# Patient Record
Sex: FEMALE | Race: AMERICAN INDIAN OR ALASKA NATIVE
[De-identification: names, ages, dates, MRNs, and addresses within clinical notes are randomized per-mention and may not be internally consistent; named-entity substitution may affect disease eponyms.]

---

## 2021-08-23 ENCOUNTER — HOSPITAL ENCOUNTER (EMERGENCY)
Dept: HOSPITAL 5 - ED | Age: 26
Discharge: HOME | End: 2021-08-23
Payer: SELF-PAY

## 2021-08-23 VITALS — DIASTOLIC BLOOD PRESSURE: 89 MMHG | SYSTOLIC BLOOD PRESSURE: 121 MMHG

## 2021-08-23 DIAGNOSIS — M79.605: Primary | ICD-10-CM

## 2021-08-23 PROCEDURE — 99281 EMR DPT VST MAYX REQ PHY/QHP: CPT

## 2021-08-23 NOTE — EMERGENCY DEPARTMENT REPORT
Chief Complaint: Animal Bite


Stated Complaint: BIT LEG SWOLLEN


Time Seen by Provider: 08/23/21 20:23





- HPI


History of Present Illness: 





26-year-old -American female presents to the emergency room for a bug 

bite to her left posterior thigh.  Patient states that she noticed it today.  

Patient denies any pain but states that it itches.  She does notice that it is a

little swollen.  No fever no chills no trauma no chest pain or shortness of 

breath.





- Exam


Physical Exam: 





Patient is alert and oriented x3 no acute distress nontoxic in appearance


Respiratory no acute distress nonlabored breathing


Cardiac regular rate


Left lower extremity posterior thigh mild erythematous mild edema to his in the 

form of a localized reaction to a bug bite.  Ambulatory without difficulty


Patient is alert and oriented normal behavior mood and judgment


MSE screening note: 


Focused history and physical exam performed.


Due to findings the following was ordered:





26-year-old -American female presents to the emergency room for a bug 

bite to her left posterior thigh.  Patient states that she noticed it today.  

Patient denies any pain but states that it itches.  She does notice that it is a

little swollen.  No fever no chills no trauma no chest pain or shortness of 

breath.








Patient appears to have a local reaction to a bug bite.  No tenderness 

erythematous mild local reaction.  Patient can place over-the-counter Benadryl 

topical or hydrocortisone.  Encourage patient not to scratch as she can get a 

secondary bacterial infection.  Patient verbalized understanding





ED Disposition for MSE


Disposition: 01 HOME / SELF CARE / HOMELESS


Is pt being admited?: No


Does the pt Need Aspirin: No


Condition: Stable


Instructions:  Insect Bite, Adult, Easy-to-Read


Additional Instructions: 


Recommend over-the-counter Benadryl cream or hydrocortisone.  Do not scratch 

that she do not want to get a secondary bacterial infection.  Follow-up with her

primary care provider if they have any further concerns


Referrals: 


[facility] [Provider Group] - 3-5 Days


Forms:  Work/School Release Form(ED)

## 2021-11-30 ENCOUNTER — HOSPITAL ENCOUNTER (EMERGENCY)
Dept: HOSPITAL 5 - ED | Age: 26
Discharge: HOME | End: 2021-11-30
Payer: SELF-PAY

## 2021-11-30 VITALS — DIASTOLIC BLOOD PRESSURE: 88 MMHG | SYSTOLIC BLOOD PRESSURE: 132 MMHG

## 2021-11-30 DIAGNOSIS — F17.200: ICD-10-CM

## 2021-11-30 DIAGNOSIS — N94.6: ICD-10-CM

## 2021-11-30 DIAGNOSIS — A59.01: Primary | ICD-10-CM

## 2021-11-30 LAB
ALBUMIN SERPL-MCNC: 4.3 G/DL (ref 3.9–5)
ALT SERPL-CCNC: 14 UNITS/L (ref 7–56)
BASOPHILS # (AUTO): 0 K/MM3 (ref 0–0.1)
BASOPHILS NFR BLD AUTO: 0.3 % (ref 0–1.8)
BILIRUB DIRECT SERPL-MCNC: < 0.2 MG/DL (ref 0–0.2)
BILIRUB UR QL STRIP: (no result)
BLOOD UR QL VISUAL: (no result)
BUN SERPL-MCNC: 8 MG/DL (ref 7–17)
BUN/CREAT SERPL: 16 %
CALCIUM SERPL-MCNC: 8.8 MG/DL (ref 8.4–10.2)
EOSINOPHIL # BLD AUTO: 0 K/MM3 (ref 0–0.4)
EOSINOPHIL NFR BLD AUTO: 0.8 % (ref 0–4.3)
HCT VFR BLD CALC: 43.2 % (ref 30.3–42.9)
HEMOLYSIS INDEX: 6
HGB BLD-MCNC: 13.8 GM/DL (ref 10.1–14.3)
LYMPHOCYTES # BLD AUTO: 0.8 K/MM3 (ref 1.2–5.4)
LYMPHOCYTES NFR BLD AUTO: 13.7 % (ref 13.4–35)
MCHC RBC AUTO-ENTMCNC: 32 % (ref 30–34)
MCV RBC AUTO: 91 FL (ref 79–97)
MONOCYTES # (AUTO): 0.5 K/MM3 (ref 0–0.8)
MONOCYTES % (AUTO): 8.9 % (ref 0–7.3)
MUCOUS THREADS #/AREA URNS HPF: (no result) /HPF
PH UR STRIP: 5 [PH] (ref 5–7)
PLATELET # BLD: 222 K/MM3 (ref 140–440)
RBC # BLD AUTO: 4.75 M/MM3 (ref 3.65–5.03)
RBC #/AREA URNS HPF: > 182 /HPF (ref 0–6)
UROBILINOGEN UR-MCNC: < 2 MG/DL (ref ?–2)
WBC #/AREA URNS HPF: < 1 /HPF (ref 0–6)

## 2021-11-30 PROCEDURE — 96372 THER/PROPH/DIAG INJ SC/IM: CPT

## 2021-11-30 PROCEDURE — 85025 COMPLETE CBC W/AUTO DIFF WBC: CPT

## 2021-11-30 PROCEDURE — 81001 URINALYSIS AUTO W/SCOPE: CPT

## 2021-11-30 PROCEDURE — 83690 ASSAY OF LIPASE: CPT

## 2021-11-30 PROCEDURE — 87210 SMEAR WET MOUNT SALINE/INK: CPT

## 2021-11-30 PROCEDURE — 99284 EMERGENCY DEPT VISIT MOD MDM: CPT

## 2021-11-30 PROCEDURE — 84703 CHORIONIC GONADOTROPIN ASSAY: CPT

## 2021-11-30 PROCEDURE — 80048 BASIC METABOLIC PNL TOTAL CA: CPT

## 2021-11-30 PROCEDURE — 80076 HEPATIC FUNCTION PANEL: CPT

## 2021-11-30 PROCEDURE — 87591 N.GONORRHOEAE DNA AMP PROB: CPT

## 2021-11-30 PROCEDURE — 36415 COLL VENOUS BLD VENIPUNCTURE: CPT

## 2021-11-30 NOTE — EMERGENCY DEPARTMENT REPORT
ED Abdominal Pain HPI





- General


Chief Complaint: Abdominal Pain


Stated Complaint: I THINK ECTOPIC


Time Seen by Provider: 11/30/21 09:46


Source: patient


Mode of arrival: Ambulatory


Limitations: No Limitations





- History of Present Illness


Initial Comments: 


26-year-old female presents to the ER today with complaints of low 

abdominal/pelvic pain. Patient states that the pain started around 6 AM this 

morning and she feels like is getting worse. She states it has been a sharp 

constant pain since it started. She states that she has vomited 4 times. She did

have a bowel movement this morning and it was normal. She states that she did 

start with her vaginal bleeding this morning. She assumed it was related to her 

menstrual cycle, but she states that she became concerned when she started 

having worsening pain. She states that the pain feels similar to when she had an

ectopic at age 21. She states her last menstrual cycle was October 30. She has 

not taken a home pregnancy test. She denies any UTI symptoms. She denies any 

abnormal vaginal discharge. She denies any new sexual partners.





MD Complaint: abdominal pain, other (pelvic pain)


-: Sudden, This morning


Severity scale (0 -10): 8





- Related Data


                                  Previous Rx's











 Medication  Instructions  Recorded  Last Taken  Type


 


Doxycycline Hyclate [Doxycycline 100 mg PO Q12HR #14 tab 11/30/21 Unknown Rx





Hyclate TAB]    


 


Ibuprofen [Motrin] 600 mg PO Q8H PRN #30 tablet 11/30/21 Unknown Rx


 


Ondansetron [Zofran Odt] 4 mg PO Q8HR #15 tab.rapdis 11/30/21 Unknown Rx











                                    Allergies











Allergy/AdvReac Type Severity Reaction Status Date / Time


 


No Known Allergies Allergy   Verified 11/30/21 10:27














ED Review of Systems


ROS: 


Stated complaint: I THINK ECTOPIC


Other details as noted in HPI





Comment: All other systems reviewed and negative


Constitutional: denies: chills, diaphoresis, fever, malaise, weakness


Respiratory: denies: cough, shortness of breath, wheezing


Cardiovascular: denies: chest pain, palpitations


Gastrointestinal: abdominal pain, nausea, vomiting, other (pelvic ).  denies: 

diarrhea, constipation, hematemesis, melena, hematochezia


Genitourinary: denies: urgency, dysuria, frequency, hematuria, discharge, 

abnormal menses, dyspareunia


Musculoskeletal: denies: back pain, joint swelling, arthralgia, myalgia


Skin: denies: rash, lesions, change in color, change in hair/nails, pruritus





ED Past Medical Hx





- Past Medical History


Additional medical history: endometriosis





- Surgical History


Additional Surgical History: ectopic preg 2016





- Social History


Smoking Status: Current Every Day Smoker


Substance Use Type: None





- Medications


Home Medications: 


                                Home Medications











 Medication  Instructions  Recorded  Confirmed  Last Taken  Type


 


Doxycycline Hyclate [Doxycycline 100 mg PO Q12HR #14 tab 11/30/21  Unknown Rx





Hyclate TAB]     


 


Ibuprofen [Motrin] 600 mg PO Q8H PRN #30 tablet 11/30/21  Unknown Rx


 


Ondansetron [Zofran Odt] 4 mg PO Q8HR #15 tab.rapdis 11/30/21  Unknown Rx














ED Physical Exam





- General


Limitations: No Limitations


General appearance: alert, in no apparent distress, anxious





- Head


Head exam: Present: atraumatic, normocephalic, normal inspection





- Eye


Eye exam: Present: normal appearance, PERRL, EOMI


Pupils: Present: normal accommodation





- Neck


Neck exam: Present: normal inspection, full ROM





- Respiratory


Respiratory exam: Present: normal lung sounds bilaterally.  Absent: respiratory 

distress, wheezes, rales, rhonchi





- Cardiovascular


Cardiovascular Exam: Present: regular rate, normal rhythm, normal heart sounds





- GI/Abdominal


GI/Abdominal exam: Present: soft, tenderness (mild suprapubic and LLQ ttp 

without guarding. ).  Absent: distended, rebound, rigid





- 


External exam: Present: other (chaperone present )


Speculum exam: Present: vaginal bleeding (small amt blood not in vag vault ).  

Absent: erythema, vaginal discharge, cervical discharge, foreign body, tissue, 

laceration


Bi-manual exam: Present: adnexal tenderness (mild ttp ).  Absent: cervical 

motion tendernes, adnexal mass, uterine enlargement, uterine tenderness





- Neurological Exam


Neurological exam: Present: alert, oriented X3, CN II-XII intact, normal gait





- Psychiatric


Psychiatric exam: Present: normal affect, normal mood





- Skin


Skin exam: Present: intact





ED Course


                                   Vital Signs











  11/30/21 11/30/21 11/30/21





  09:32 09:36 10:27


 


Temperature  97.6 F 97.6 F


 


Pulse Rate  60 80


 


Respiratory  16 16





Rate   


 


Blood Pressure   132/88


 


Blood Pressure  139/71 





[Right]   


 


O2 Sat by Pulse 98 96 100





Oximetry   














ED Medical Decision Making





- Lab Data


Result diagrams: 


                                 11/30/21 11:09





                                 11/30/21 11:09





- Medical Decision Making


Patient reports that she feels much better after oral hydrocodone and Zofran.  

Repeat abdominal exam shows a soft nontender abdomen.  She is not toxic or ill-

appearing and she currently is not in any significant distress.  Her vital signs

 have been stable.  She is neurologically intact.


Labs reviewed and unremarkable.  hCG negative.  Urinalysis does not suggest UTI.

  Wet prep does show patient has trichomonas.  Discussed results with patient.  

Will be treated for trichomonas, but also be treated prophylactically for GC to 

cover for possible PID which could cause her pain.  I do suspect that her pain 

could also be related to her menstrual cycle which started today.  Patient 

understands her partner needs to be treated.  She understands all instructions 

and agree with plan.  Patient was stable at time of discharge.


Critical care attestation.: 


If time is entered above; I have spent that time in minutes in the direct care 

of this critically ill patient, excluding procedure time.








ED Disposition


Clinical Impression: 


 Trichomonal vaginitis, Dysmenorrhea, Pelvic pain





Disposition: 01 HOME / SELF CARE / HOMELESS


Is pt being admited?: No


Does the pt Need Aspirin: No


Condition: Stable


Instructions:  Trichomoniasis, Dysmenorrhea, Pelvic Inflammatory Disease, 

Easy-to-Read, Abdominal Pain (ED)


Additional Instructions: 


I recommend that you take the doxycycline as prescribed to completion.  Take the

 ibuprofen as prescribed for pain.  Take the Zofran as prescribed for nausea and

 vomiting.  Your partner should also get tested and treated for trichomonas, 

gonorrhea and chlamydia.  You can follow-up with the health department for any 

additional STD testing.  Follow-up with your PCP and OB/GYN.  Return to the ER 

if your symptoms changes or worsens in any way.


Prescriptions: 


Doxycycline Hyclate [Doxycycline Hyclate TAB] 100 mg PO Q12HR #14 tab


Ibuprofen [Motrin] 600 mg PO Q8H PRN #30 tablet


 PRN Reason: Pain


Ondansetron [Zofran Odt] 4 mg PO Q8HR #15 tab.rapdis


Referrals: 


[facility] [Primary Care Provider] - 3-5 Days


[facility] [Provider Group] - 3-5 Days


Forms:  Work/School Release Form(ED)


Time of Disposition: 12:23